# Patient Record
Sex: MALE | Race: WHITE | NOT HISPANIC OR LATINO | URBAN - METROPOLITAN AREA
[De-identification: names, ages, dates, MRNs, and addresses within clinical notes are randomized per-mention and may not be internally consistent; named-entity substitution may affect disease eponyms.]

---

## 2018-03-31 ENCOUNTER — EMERGENCY (EMERGENCY)
Facility: HOSPITAL | Age: 83
LOS: 1 days | Discharge: HOME | End: 2018-03-31
Admitting: EMERGENCY MEDICINE
Payer: MEDICARE

## 2018-03-31 DIAGNOSIS — Z87.891 PERSONAL HISTORY OF NICOTINE DEPENDENCE: ICD-10-CM

## 2018-03-31 DIAGNOSIS — F03.90 UNSPECIFIED DEMENTIA WITHOUT BEHAVIORAL DISTURBANCE: ICD-10-CM

## 2018-03-31 DIAGNOSIS — N40.0 BENIGN PROSTATIC HYPERPLASIA WITHOUT LOWER URINARY TRACT SYMPTOMS: ICD-10-CM

## 2018-03-31 DIAGNOSIS — R55 SYNCOPE AND COLLAPSE: ICD-10-CM

## 2018-03-31 DIAGNOSIS — S09.90XA UNSPECIFIED INJURY OF HEAD, INITIAL ENCOUNTER: ICD-10-CM
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
## 2018-03-31 DIAGNOSIS — R51 HEADACHE: ICD-10-CM
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
## 2018-03-31 DIAGNOSIS — K21.9 GASTRO-ESOPHAGEAL REFLUX DISEASE WITHOUT ESOPHAGITIS: ICD-10-CM

## 2018-03-31 DIAGNOSIS — I10 ESSENTIAL (PRIMARY) HYPERTENSION: ICD-10-CM

## 2018-03-31 DIAGNOSIS — S00.91XA ABRASION OF UNSPECIFIED PART OF HEAD, INITIAL ENCOUNTER: ICD-10-CM

## 2018-04-01 PROCEDURE — 83735 ASSAY OF MAGNESIUM: CPT

## 2018-04-01 PROCEDURE — 93005 ELECTROCARDIOGRAM TRACING: CPT

## 2018-04-01 PROCEDURE — 90471 IMMUNIZATION ADMIN: CPT

## 2018-04-01 PROCEDURE — 96361 HYDRATE IV INFUSION ADD-ON: CPT

## 2018-04-01 PROCEDURE — 93306 TTE W/DOPPLER COMPLETE: CPT

## 2018-04-01 PROCEDURE — 71045 X-RAY EXAM CHEST 1 VIEW: CPT

## 2018-04-01 PROCEDURE — 85610 PROTHROMBIN TIME: CPT

## 2018-04-01 PROCEDURE — 80048 BASIC METABOLIC PNL TOTAL CA: CPT

## 2018-04-01 PROCEDURE — 70450 CT HEAD/BRAIN W/O DYE: CPT

## 2018-04-01 PROCEDURE — 72125 CT NECK SPINE W/O DYE: CPT

## 2018-04-01 PROCEDURE — 84484 ASSAY OF TROPONIN QUANT: CPT

## 2018-04-01 PROCEDURE — 96360 HYDRATION IV INFUSION INIT: CPT

## 2018-04-01 PROCEDURE — 90715 TDAP VACCINE 7 YRS/> IM: CPT

## 2018-04-01 PROCEDURE — 85730 THROMBOPLASTIN TIME PARTIAL: CPT

## 2018-04-01 PROCEDURE — 84439 ASSAY OF FREE THYROXINE: CPT

## 2018-04-01 PROCEDURE — 85027 COMPLETE CBC AUTOMATED: CPT

## 2018-04-01 PROCEDURE — 99285 EMERGENCY DEPT VISIT HI MDM: CPT | Mod: 25

## 2018-04-01 PROCEDURE — 84443 ASSAY THYROID STIM HORMONE: CPT

## 2022-05-03 DIAGNOSIS — S32.031D: ICD-10-CM

## 2022-06-13 ENCOUNTER — APPOINTMENT (OUTPATIENT)
Dept: ORTHOPEDIC SURGERY | Facility: CLINIC | Age: 87
End: 2022-06-13

## 2023-02-22 ENCOUNTER — FORM ENCOUNTER (OUTPATIENT)
Age: 88
End: 2023-02-22

## 2023-02-27 ENCOUNTER — FORM ENCOUNTER (OUTPATIENT)
Age: 88
End: 2023-02-27

## 2023-03-01 ENCOUNTER — APPOINTMENT (OUTPATIENT)
Dept: ORTHOPEDIC SURGERY | Facility: CLINIC | Age: 88
End: 2023-03-01
Payer: MEDICARE

## 2023-03-01 VITALS — BODY MASS INDEX: 25.11 KG/M2 | WEIGHT: 160 LBS | HEIGHT: 67 IN

## 2023-03-01 PROCEDURE — 99213 OFFICE O/P EST LOW 20 MIN: CPT

## 2023-03-01 NOTE — ASSESSMENT
[FreeTextEntry1] : Last [facility] XR 3/17/21 ~7* focal kyphosis L3 with 30% height loss \par Outside [facility] XRs 2/11/23 identical, would not send him for a new MRI\par Pain c/s\par PT\par

## 2023-03-01 NOTE — HISTORY OF PRESENT ILLNESS
[Lower back] : lower back [Constant] : constant [de-identified] : \par L MRI:\par Findings: Convex left curvature of the lumbar spine. Acute fracture of the superior endplate of L3 with loss of\par height. No significant bony retropulsion. Schmorl's nodes with small 3-mm cyst at the inferior endplate of L1.\par No fracture. Schmorl's nodes at inferior L4 and superior L5.\par T12-L1: No herniation, foraminal stenosis, or central stenosis.\par L1-L2: No herniation, foraminal stenosis, or central stenosis. Facet hypertrophy.\par L2-L3: Broad bulge and facet hypertrophy with ligamentum flavum hypertrophy resulting in foraminal stenosis\par and impingement on the exiting nerve roots, left greater than right. Left lateral herniation. Mild central stenosis.\par L3-L4: Vacuum disc. Grade 1 anterior spondylolisthesis. Broad bulge, spondylotic ridge, facet hypertrophy, and\par ligamentum flavum hypertrophy with foraminal stenosis and impingement on the exiting nerve roots. Moderate\par central stenosis. No focal herniation.\par L4-L5: Vacuum disc. Grade 1 anterior spondylolisthesis. Broad bulge, facet hypertrophy, and ligamentum\par flavum hypertrophy with foraminal stenosis and impingement on the exiting nerve roots. Left-sided facet joint\par effusion. Moderate central stenosis. No focal herniation.\par L5-S1: Broad bulge and facet hypertrophy with foraminal stenosis and impingement on the exiting nerve roots.\par Mild central stenosis. Bulge contacts the S1 nerve roots. No central herniation.\par No aneurysm of the aorta. Soft tissues are intact. No pre - or paravertebral soft tissue lesion.\par Arthrosis of the sacroiliac joints. Diffuse osteopenia.\par MRI Lumbar Spine: L3 burst fracture without significant focal kyphosis though with central superior endplate depression\par PLC w/o signal\par \par ---------------------------------------\par DOI: ~December 2020 likely had a fall in to the snow\par 1-6-21- Continued LBP across the beltline. Has been through PT for strengthening without lasting relief\par 1/20/21- sxs the same. No radicular complaints. Reports primarily LBP and stiffness.\par 3/17/21- Pain improving, no rad. Just back stiffness. Has been wearing TLSO.\par 4/30/21- Weaning from brace.\par 9/22/21- Wears TLSO PRN, in PT with some help.\par 3/1/23- was seen by PCP, continued LBP, went for XRs at [facility], L3 comp. fracture seen. Also c/o LBP radiating down the posterolateral LLE to the ankle [] : no [FreeTextEntry7] : left leg

## 2023-05-01 ENCOUNTER — APPOINTMENT (OUTPATIENT)
Dept: PAIN MANAGEMENT | Facility: CLINIC | Age: 88
End: 2023-05-01
Payer: MEDICARE

## 2023-05-01 VITALS — HEIGHT: 67 IN | WEIGHT: 160 LBS | BODY MASS INDEX: 25.11 KG/M2

## 2023-05-01 DIAGNOSIS — M51.9 UNSPECIFIED THORACIC, THORACOLUMBAR AND LUMBOSACRAL INTERVERTEBRAL DISC DISORDER: ICD-10-CM

## 2023-05-01 PROCEDURE — 99203 OFFICE O/P NEW LOW 30 MIN: CPT

## 2023-05-01 NOTE — PHYSICAL EXAM
[de-identified] : Constitutional; Appears well, no apparent distress\par Ability to communicate: Normal \par Respiratory: non-labored breathing\par Skin: No rash noted\par Head: Normocephalic, atraumatic\par Neck: no visible thyroid enlargement\par Eyes: Extraocular movements intact\par Neurologic: Alert and oriented x3\par Psychiatric: normal mood, affect and behavior\par  [] : uses walker

## 2023-05-01 NOTE — DISCUSSION/SUMMARY
[de-identified] : After discussing various treatment options with the patient including but not limited to oral medications, physical therapy, exercise modalities as well as interventional spinal injections, we have decided with the following plan:\par \par - Continue home exercises, stretching, activity modification, physical therapy, and conservative care.\par - MRI report and/or images was reviewed and discussed with the patient.\par - Follow-up as needed.

## 2023-05-01 NOTE — HISTORY OF PRESENT ILLNESS
[Lower back] : lower back [5] : 5 [0] : 0 [Dull/Aching] : dull/aching [Nothing helps with pain getting better] : Nothing helps with pain getting better [Standing] : standing [Walking] : walking [] : no [FreeTextEntry1] : right  [de-identified] : X RAY L

## 2023-08-21 ENCOUNTER — APPOINTMENT (OUTPATIENT)
Dept: ORTHOPEDIC SURGERY | Facility: CLINIC | Age: 88
End: 2023-08-21

## 2023-08-26 ENCOUNTER — APPOINTMENT (OUTPATIENT)
Dept: ORTHOPEDIC SURGERY | Facility: CLINIC | Age: 88
End: 2023-08-26
Payer: MEDICARE

## 2023-08-26 VITALS — BODY MASS INDEX: 25.11 KG/M2 | WEIGHT: 160 LBS | HEIGHT: 67 IN

## 2023-08-26 DIAGNOSIS — M43.16 SPONDYLOLISTHESIS, LUMBAR REGION: ICD-10-CM

## 2023-08-26 PROCEDURE — 99214 OFFICE O/P EST MOD 30 MIN: CPT

## 2023-08-26 NOTE — HISTORY OF PRESENT ILLNESS
[Lower back] : lower back [Constant] : constant [de-identified] : L MRI: Findings: Convex left curvature of the lumbar spine. Acute fracture of the superior endplate of L3 with loss of height. No significant bony retropulsion. Schmorl's nodes with small 3-mm cyst at the inferior endplate of L1. No fracture. Schmorl's nodes at inferior L4 and superior L5. T12-L1: No herniation, foraminal stenosis, or central stenosis. L1-L2: No herniation, foraminal stenosis, or central stenosis. Facet hypertrophy. L2-L3: Broad bulge and facet hypertrophy with ligamentum flavum hypertrophy resulting in foraminal stenosis and impingement on the exiting nerve roots, left greater than right. Left lateral herniation. Mild central stenosis. L3-L4: Vacuum disc. Grade 1 anterior spondylolisthesis. Broad bulge, spondylotic ridge, facet hypertrophy, and ligamentum flavum hypertrophy with foraminal stenosis and impingement on the exiting nerve roots. Moderate central stenosis. No focal herniation. L4-L5: Vacuum disc. Grade 1 anterior spondylolisthesis. Broad bulge, facet hypertrophy, and ligamentum flavum hypertrophy with foraminal stenosis and impingement on the exiting nerve roots. Left-sided facet joint effusion. Moderate central stenosis. No focal herniation. L5-S1: Broad bulge and facet hypertrophy with foraminal stenosis and impingement on the exiting nerve roots. Mild central stenosis. Bulge contacts the S1 nerve roots. No central herniation. No aneurysm of the aorta. Soft tissues are intact. No pre - or paravertebral soft tissue lesion. Arthrosis of the sacroiliac joints. Diffuse osteopenia. MRI Lumbar Spine: L3 burst fracture without significant focal kyphosis though with central superior endplate depression PLC w/o signal  --------------------------------------- DOI: ~December 2020 likely had a fall in to the snow 1-6-21- Continued LBP across the beltline. Has been through PT for strengthening without lasting relief 1/20/21- sxs the same. No radicular complaints. Reports primarily LBP and stiffness. 3/17/21- Pain improving, no rad. Just back stiffness. Has been wearing TLSO. 4/30/21- Weaning from brace. 9/22/21- Wears TLSO PRN, in PT with some help. 3/1/23- was seen by PCP, continued LBP, went for XRs at [facility], L3 comp. fracture seen. Also c/o LBP radiating down the posterolateral LLE to the ankle 8/26/23- CHELY 89-year-old M is here for Lower Back, states he is doing the physical therapy for his lower back. Still LLE radic to the posterolateral ankle [] : no [FreeTextEntry7] : left leg

## 2023-08-26 NOTE — IMAGING
[de-identified] : LSPINE ROM: limited all planes Strength: 5/5 bilateral hip flexors, knee extensors, ankle dorsiflexors, EHL, ankle plantarflexors Sensation: Sensation present to light touch bilateral L2-S1 distributions Provocative maneuvers: Negative bilateral straight leg raise

## 2023-09-12 ENCOUNTER — APPOINTMENT (OUTPATIENT)
Dept: MRI IMAGING | Facility: CLINIC | Age: 88
End: 2023-09-12
Payer: MEDICARE

## 2023-09-12 PROCEDURE — 72148 MRI LUMBAR SPINE W/O DYE: CPT | Mod: MH

## 2023-09-28 ENCOUNTER — APPOINTMENT (OUTPATIENT)
Dept: PAIN MANAGEMENT | Facility: CLINIC | Age: 88
End: 2023-09-28
Payer: MEDICARE

## 2023-09-28 VITALS — HEIGHT: 67 IN | BODY MASS INDEX: 25.11 KG/M2 | WEIGHT: 160 LBS

## 2023-09-28 PROCEDURE — 99214 OFFICE O/P EST MOD 30 MIN: CPT

## 2023-10-24 ENCOUNTER — APPOINTMENT (OUTPATIENT)
Age: 88
End: 2023-10-24
Payer: MEDICARE

## 2023-10-24 PROCEDURE — 62323 NJX INTERLAMINAR LMBR/SAC: CPT

## 2023-11-13 ENCOUNTER — APPOINTMENT (OUTPATIENT)
Dept: PAIN MANAGEMENT | Facility: CLINIC | Age: 88
End: 2023-11-13
Payer: MEDICARE

## 2023-11-13 VITALS — BODY MASS INDEX: 25.11 KG/M2 | WEIGHT: 160 LBS | HEIGHT: 67 IN

## 2023-11-13 DIAGNOSIS — M54.17 RADICULOPATHY, LUMBOSACRAL REGION: ICD-10-CM

## 2023-11-13 PROCEDURE — 99213 OFFICE O/P EST LOW 20 MIN: CPT

## 2024-04-25 ENCOUNTER — APPOINTMENT (OUTPATIENT)
Dept: PAIN MANAGEMENT | Facility: CLINIC | Age: 89
End: 2024-04-25
Payer: MEDICARE

## 2024-04-25 VITALS — HEIGHT: 67 IN | BODY MASS INDEX: 25.11 KG/M2 | WEIGHT: 160 LBS

## 2024-04-25 DIAGNOSIS — M47.816 SPONDYLOSIS W/OUT MYELOPATHY OR RADICULOPATHY, LUMBAR REGION: ICD-10-CM

## 2024-04-25 DIAGNOSIS — Z00.00 ENCOUNTER FOR GENERAL ADULT MEDICAL EXAMINATION W/OUT ABNORMAL FINDINGS: ICD-10-CM

## 2024-04-25 PROCEDURE — 99214 OFFICE O/P EST MOD 30 MIN: CPT

## 2024-04-25 NOTE — PHYSICAL EXAM
[de-identified] : PHYSICAL EXAM  Constitutional:  Appears well, no apparent distress Ability to communicate: Normal Respiratory: non-labored breathing Skin: no rash noted Head: normocephalic, atraumatic Neck: no visible thyroid enlargement Eyes: extraocular movements intact Neurologic: alert and oriented x3 Psychiatric: normal mood, affect, and behavior  Lumbar Spine:  Palpation: left and right lumbar paraspinal spasm and left and right lumbar paraspinal tenderness to palpation. ROM: Diminished range of motion in all plains.  Patient notes pain with lateral bending to the left and right. MMT: Motor exam is 5/5 through out bilateral lower extremities. Sensation: Light touch and pain is intact throughout bilateral lower extremities. Reflexes: achilles and patella reflexes are intact and  symmetrical.  No sustained clonus. Special Testing: Positive kemps maneuver on the left and right side +walker

## 2024-04-25 NOTE — DISCUSSION/SUMMARY
[de-identified] : proceed L5-S1 LESI  After discussing various treatment options with the patient including but not limited to oral medications, physical therapy, exercise, modalities as well as interventional spinal injections, we have decided with the following plan: I personally reviewed the MRI/CT scan images and agree with the radiologist's report. The radiological findings were discussed with the patient. The risks, benefits, contents and alternatives to injection were explained in full to the patient. Risks outlined include but are not limited to infection,sepsis, bleeding, post-dural puncture headache, nerve damage, temporary increase in pain, syncopal episode, failure to resolve symptoms, allergic reaction, symptom recurrence, and elevation of blood sugar in diabetics. Cortisone may cause immunosuppression. Patient understands the risks. All questions were answered. After discussion of options, patient requested an injection. Information regarding the injection was given to the patient. Which medications to stop prior to the injection was explained to the patient as well. Follow up in 1-2 weeks post injection for re-evaluation. Continue Home exercises, stretching, activity modification, physical therapy, and conservative care. Patient is presenting with acute/sub-acute radicular pain with impairment in ADLs and functionality. The pain has not responded to conservative care including nsaid therapy and/or physical therapy. There is no bleeding tendency, unstable medical condition, or systemic infection.

## 2024-04-25 NOTE — HISTORY OF PRESENT ILLNESS
[Lower back] : lower back [7] : 7 [5] : 5 [Radiating] : radiating [Constant] : constant [Heat] : heat [Walking/activity] : walking/activity [Physical therapy] : physical therapy [Sitting] : sitting [Walking] : walking [FreeTextEntry1] : pt is following up after procedure states it barley helped him his balance is off and he is walking slow  [] : no [FreeTextEntry7] : left leg

## 2024-04-25 NOTE — ASSESSMENT
[FreeTextEntry1] : 10/23 LESI L5-S1 >50% relief sustained several months  c/o pain in lower back, R>L

## 2024-05-07 ENCOUNTER — APPOINTMENT (OUTPATIENT)
Age: 89
End: 2024-05-07
Payer: MEDICARE

## 2024-05-07 PROCEDURE — 62323 NJX INTERLAMINAR LMBR/SAC: CPT

## 2024-05-29 ENCOUNTER — APPOINTMENT (OUTPATIENT)
Dept: PAIN MANAGEMENT | Facility: CLINIC | Age: 89
End: 2024-05-29

## 2024-06-05 NOTE — HISTORY OF PRESENT ILLNESS
[Lower back] : lower back [7] : 7 [5] : 5 [Radiating] : radiating [Constant] : constant [Heat] : heat [Walking/activity] : walking/activity [Physical therapy] : physical therapy [Sitting] : sitting [Walking] : walking [FreeTextEntry1] : L5-S1 LESI- 5/7/2024 [] : no [FreeTextEntry7] : left leg

## 2024-11-21 ENCOUNTER — APPOINTMENT (OUTPATIENT)
Dept: PAIN MANAGEMENT | Facility: CLINIC | Age: 89
End: 2024-11-21
Payer: MEDICARE

## 2024-11-21 VITALS — HEIGHT: 67 IN | WEIGHT: 160 LBS | BODY MASS INDEX: 25.11 KG/M2

## 2024-11-21 DIAGNOSIS — M51.9 UNSPECIFIED THORACIC, THORACOLUMBAR AND LUMBOSACRAL INTERVERTEBRAL DISC DISORDER: ICD-10-CM

## 2024-11-21 PROCEDURE — 99214 OFFICE O/P EST MOD 30 MIN: CPT

## 2024-12-03 ENCOUNTER — APPOINTMENT (OUTPATIENT)
Age: 88
End: 2024-12-03
Payer: MEDICARE

## 2024-12-03 PROCEDURE — 62323 NJX INTERLAMINAR LMBR/SAC: CPT

## 2024-12-19 ENCOUNTER — APPOINTMENT (OUTPATIENT)
Dept: PAIN MANAGEMENT | Facility: CLINIC | Age: 88
End: 2024-12-19